# Patient Record
Sex: FEMALE | Employment: OTHER | URBAN - METROPOLITAN AREA
[De-identification: names, ages, dates, MRNs, and addresses within clinical notes are randomized per-mention and may not be internally consistent; named-entity substitution may affect disease eponyms.]

---

## 2024-05-20 ENCOUNTER — HOSPITAL ENCOUNTER (OUTPATIENT)
Dept: PHYSICAL THERAPY | Age: 84
Setting detail: RECURRING SERIES
Discharge: HOME | End: 2024-05-23
Payer: MEDICARE

## 2024-05-20 DIAGNOSIS — R29.3 ABNORMAL POSTURE: ICD-10-CM

## 2024-05-20 DIAGNOSIS — R42 DIZZINESS AND GIDDINESS: Primary | ICD-10-CM

## 2024-05-20 PROCEDURE — 97161 PT EVAL LOW COMPLEX 20 MIN: CPT

## 2024-05-20 PROCEDURE — 97530 THERAPEUTIC ACTIVITIES: CPT

## 2024-05-20 ASSESSMENT — PAIN SCALES - GENERAL: PAINLEVEL_OUTOF10: 0

## 2024-05-20 NOTE — THERAPY EVALUATION
Sherry Louise  : 1940  Primary: Medicare Part A And B (Medicare)  [facility] [place]  [place] [address]  Phone: [phone]  Fax: [phone] Plan Frequency: 2 times a week for up to 90 days  Plan of Care/Certification Expiration Date: 24        Plan of Care/Certification Expiration Date:  Plan of Care/Certification Expiration Date: 24    Frequency/Duration: Plan Frequency: 2 times a week for up to 90 days      Time In/Out:   Time In: 1015  Time Out: 1054      PT Visit Info:    Progress Note Counter: 1      Visit Count:  1                OUTPATIENT PHYSICAL THERAPY:             Initial Assessment 2024               Episode (dizziness)         Treatment Diagnosis:     Dizziness and giddiness  Abnormal posture  Medical/Referring Diagnosis:    Vertigo [R42]    Referring Physician:  Gloria Alves MD MD Orders:  PT Eval and Treat   Return MD Appt:  unknown  Date of Onset:  Onset Date: 23     Allergies:  Patient has no allergy information on record.  Restrictions/Precautions:    None      Medications Last Reviewed:  2024     SUBJECTIVE   History of Injury/Illness (Reason for Referral):  Patient reports when she lays flat, she gets dizzy.  Says it happens when she goes to the dentist office and when she got a perm at the salon. When she turns over onto her L side it will make her dizzy, she sleeps on 2 pillows at night.  Her MD said it might be her sinuses.  It started a year ago.  Last fall was about 1 year ago.   Patient Stated Goal(s):  \"get rid of the dizziness\"  Initial Pain Level:      0/10   Post Session Pain Level:     0/10  Past Medical History/Comorbidities:   Ms. Louise  has no past medical history on file.  Ms. Louise  has no past surgical history on file.  Social History/Living Environment:   Patient lives alone    Prior Level of Function/Work/Activity:   Prior Level of

## 2024-05-21 NOTE — PROGRESS NOTES
Sherry Louise  : 1940  Primary: Medicare Part A And B (Medicare)  Secondary: MICEHLLE CARPENTER [facility] [place] [address] [place] [address]  Phone: [phone]  Fax: [phone] Plan Frequency: 2 times a week for up to 90 days    Plan of Care/Certification Expiration Date: 24        Plan of Care/Certification Expiration Date:  Plan of Care/Certification Expiration Date: 24    Frequency/Duration:   Plan Frequency: 2 times a week for up to 90 days      Time In/Out:   Time In: 1015  Time Out: 1054      PT Visit Info:    Progress Note Counter: 1      Visit Count:  1    OUTPATIENT PHYSICAL THERAPY:   Treatment Note 2024       Episode  (dizziness)               Treatment Diagnosis:    Dizziness and giddiness  Abnormal posture  Medical/Referring Diagnosis:    Vertigo [R42]    Referring Physician:  Gloria Alves MD MD Orders:  PT Eval and Treat   Return MD Appt:  unknown   Date of Onset:  Onset Date: 23     Allergies:   Patient has no allergy information on record.  Restrictions/Precautions:   None      Interventions Planned (Treatment may consist of any combination of the following):     See Assessment Note    Subjective Comments:   See eval  Initial Pain Level::     0/10  Post Session Pain Level:       0/10  Medications Last Reviewed:  2024  Updated Objective Findings:  See Evaluation Note from today  Treatment   THERAPEUTIC ACTIVITY: ( 15 minutes):    Therapeutic activities per grid below to improve mobility and dynamic movement of the head and body without dizziness .  Required moderate visual, verbal, and manual cues to  decrease episodes of vertigo .  Epley maneuver to the left,  1st rep:    Position 1:  Positive symptoms, Positive nystagmus.  Held position 60 seconds. Increased latency  Position 2:  Positive symptoms, Positive nystagmus.  Held position 60 seconds. downbeating  Position 3:  Negative symptoms, Negative

## 2024-05-22 ENCOUNTER — HOSPITAL ENCOUNTER (OUTPATIENT)
Dept: PHYSICAL THERAPY | Age: 84
Setting detail: RECURRING SERIES
Discharge: HOME | End: 2024-05-25
Payer: MEDICARE

## 2024-05-22 PROCEDURE — 97530 THERAPEUTIC ACTIVITIES: CPT

## 2024-05-22 ASSESSMENT — PAIN SCALES - GENERAL: PAINLEVEL_OUTOF10: 0

## 2024-05-22 NOTE — PROGRESS NOTES
Sherry Louise  : 1940  Primary: Medicare Part A And B (Medicare)  Secondary: MICHELLE CARPENTER [facility] [place] [address] [place] [address]  Phone: [phone]  Fax: [phone] Plan Frequency: 2 times a week for up to 90 days    Plan of Care/Certification Expiration Date: 24        Plan of Care/Certification Expiration Date:  Plan of Care/Certification Expiration Date: 24    Frequency/Duration:   Plan Frequency: 2 times a week for up to 90 days      Time In/Out:   Time In: 1100  Time Out: 1130      PT Visit Info:    Progress Note Counter: 2      Visit Count:  2    OUTPATIENT PHYSICAL THERAPY:   Treatment Note 2024       Episode  (dizziness)               Treatment Diagnosis:    No data found  Medical/Referring Diagnosis:    Vertigo [R42]    Referring Physician:  Gloria Alves MD MD Orders:  PT Eval and Treat   Return MD Appt:  unknown   Date of Onset:  Onset Date: 23     Allergies:   Patient has no allergy information on record.  Restrictions/Precautions:   None      Interventions Planned (Treatment may consist of any combination of the following):     See Assessment Note    Subjective Comments:   Patient reports she got dizzy when she laid down in bed last night.  Initial Pain Level::     0/10  Post Session Pain Level:       0/10  Medications Last Reviewed:  2024  Updated Objective Findings:  See Evaluation Note from today  Treatment   THERAPEUTIC ACTIVITY: ( 25 minutes):    Therapeutic activities per grid below to improve mobility and dynamic movement of the head and body without dizziness .  Required moderate visual, verbal, and manual cues to  decrease episodes of vertigo .  Epley maneuver to the left,  1st rep:    Position 1:  Positive symptoms, Positive nystagmus.  Held position 60 seconds.   Position 2:  Positive symptoms, Positive nystagmus.  Held position 60 seconds. Downbeating, short duration, increased

## 2024-05-29 ENCOUNTER — HOSPITAL ENCOUNTER (OUTPATIENT)
Dept: PHYSICAL THERAPY | Age: 84
Setting detail: RECURRING SERIES
Discharge: HOME | End: 2024-06-01
Payer: MEDICARE

## 2024-05-29 PROCEDURE — 97530 THERAPEUTIC ACTIVITIES: CPT

## 2024-05-29 ASSESSMENT — PAIN SCALES - GENERAL: PAINLEVEL_OUTOF10: 0

## 2024-05-29 NOTE — PROGRESS NOTES
Sherry Louise  : 1940  Primary: Medicare Part A And B (Medicare)  Secondary: MICHELLE CARPENTER [facility] @ [place]  [place] [address]  Phone: [phone]  Fax: [phone] Plan Frequency: 2 times a week for up to 90 days    Plan of Care/Certification Expiration Date: 24        Plan of Care/Certification Expiration Date:  Plan of Care/Certification Expiration Date: 24    Frequency/Duration:   Plan Frequency: 2 times a week for up to 90 days      Time In/Out:   Time In: 1015  Time Out: 1042      PT Visit Info:    Progress Note Counter: 3      Visit Count:  3    OUTPATIENT PHYSICAL THERAPY:   Treatment Note 2024       Episode  (dizziness)               Treatment Diagnosis:    No data found  Medical/Referring Diagnosis:    Vertigo [R42]    Referring Physician:  Gloria Alves MD MD Orders:  PT Eval and Treat   Return MD Appt:  unknown   Date of Onset:  Onset Date: 23     Allergies:   Patient has no allergy information on record.  Restrictions/Precautions:   None      Interventions Planned (Treatment may consist of any combination of the following):     See Assessment Note    Subjective Comments:   Patient reports she didn't have any dizziness why laying back to put eye drops in yesterday.  Initial Pain Level::     0/10  Post Session Pain Level:       0/10  Medications Last Reviewed:  2024  Updated Objective Findings:  None Today  Treatment   THERAPEUTIC ACTIVITY: ( 25 minutes):    Therapeutic activities per grid below to improve mobility and dynamic movement of the head and body without dizziness .  Required moderate visual, verbal, and manual cues to  decrease episodes of vertigo .  Epley maneuver to the left,  1st rep:    Position 1:  Positive symptoms, Positive nystagmus.  Held position 60 seconds. Decrease intensitry  Position 2:  Positive symptoms, Positive nystagmus.  Held position 60 seconds. Downbeating, short

## 2024-06-04 ENCOUNTER — HOSPITAL ENCOUNTER (OUTPATIENT)
Dept: PHYSICAL THERAPY | Age: 84
Setting detail: RECURRING SERIES
Discharge: HOME | End: 2024-06-07
Payer: MEDICARE

## 2024-06-04 PROCEDURE — 97530 THERAPEUTIC ACTIVITIES: CPT

## 2024-06-04 ASSESSMENT — PAIN SCALES - GENERAL: PAINLEVEL_OUTOF10: 0

## 2024-06-04 NOTE — PROGRESS NOTES
seconds. Downbeating, short duration  Position 3:  Negative symptoms, Negative nystagmus.  Held position 90 seconds.  Position 4:  Negative symptoms, Negative nystagmus.  Held position 90 seconds.    Epley maneuver to the left,  2nd rep:    Position 1:  Positive symptoms, Positive nystagmus.  Held position 90 seconds. Decreased latency and duration  Position 2:  Positive symptoms, Positive nystagmus.  Held position 90 seconds. Very short duration, short latency  Position 3:  Negative symptoms, Negative nystagmus.  Held position 90 seconds.  Position 4:  Negative symptoms, Negative nystagmus.  Held position 90 seconds.    Epley maneuver to the left,  3rd rep:    Position 1:  Positive symptoms, Positive nystagmus.  Held position 90 seconds. Increased intensity, short duration  Position 2:  Negative symptoms, Negative nystagmus.  Held position 90 seconds.  Position 3:  Negative symptoms, Negative nystagmus.  Held position 60 seconds.  Position 4:  Negative symptoms, Negative nystagmus.  Held position 60 seconds.              Treatment/Session Summary:    Treatment Assessment: patient finally didn't have downbeating nystagmus in the second position on the thrid rep.  The nystagmus was more intense on the third rep, hopefully indicating movement.  Will recheck at next visit but appears to be progressing.     Communication/Consultation:  None today  Equipment provided today:  None  Recommendations/Intent for next treatment session: Next visit will focus on decreasing vertigo.    >Total Treatment Billable Duration:  30 minutes   Time In: 1010  Time Out: 1041    Yvette Martin PT         Charge Capture  [facility]  Appt Desk  Attendance Report     Future Appointments   Date Time Provider Department [place]   6/10/2024 11:45 AM Yvette Martin PT [facility]

## 2024-06-10 ENCOUNTER — HOSPITAL ENCOUNTER (OUTPATIENT)
Dept: PHYSICAL THERAPY | Age: 84
Setting detail: RECURRING SERIES
Discharge: HOME | End: 2024-06-13
Payer: MEDICARE

## 2024-06-10 PROCEDURE — 97530 THERAPEUTIC ACTIVITIES: CPT

## 2024-06-10 ASSESSMENT — PAIN SCALES - GENERAL: PAINLEVEL_OUTOF10: 0

## 2024-06-10 NOTE — PROGRESS NOTES
Sherry Louise  : 1940  Primary: Medicare Part A And B (Medicare)  Secondary: MICHELLE CARPENTER [facility] [place]  [address] [place] [address]  Phone: [phone]  Fax: [phone] Plan Frequency: 2 times a week for up to 90 days    Plan of Care/Certification Expiration Date: 24        Plan of Care/Certification Expiration Date:  Plan of Care/Certification Expiration Date: 24    Frequency/Duration:   Plan Frequency: 2 times a week for up to 90 days      Time In/Out:   Time In: 1145  Time Out: 1210      PT Visit Info:    Progress Note Counter: 5      Visit Count:  5    OUTPATIENT PHYSICAL THERAPY:   Treatment Note 6/10/2024       Episode  (dizziness)               Treatment Diagnosis:    No data found  Medical/Referring Diagnosis:    Vertigo [R42]    Referring Physician:  Gloria Alves MD MD Orders:  PT Eval and Treat   Return MD Appt:  unknown   Date of Onset:  Onset Date: 23     Allergies:   Patient has no allergy information on record.  Restrictions/Precautions:   None      Interventions Planned (Treatment may consist of any combination of the following):     See Assessment Note    Subjective Comments:   Patient reports when she went to put eye drops in her eyes after last visit, she got a big wave of dizziness.  It hasn't happened since that day.  Initial Pain Level::     0/10  Post Session Pain Level:       0/10  Medications Last Reviewed:  6/10/2024  Updated Objective Findings:  None Today  Treatment   THERAPEUTIC ACTIVITY: ( 25 minutes):    Therapeutic activities per grid below to improve mobility and dynamic movement of the head and body without dizziness .  Required moderate visual, verbal, and manual cues to  decrease episodes of vertigo .  Epley maneuver to the left,  1st rep:    Position 1:  Positive symptoms, Positive nystagmus.  Held position 90 seconds. Decrease intensitry, moderate duration but still long

## 2024-06-28 ENCOUNTER — HOSPITAL ENCOUNTER (OUTPATIENT)
Dept: PHYSICAL THERAPY | Age: 84
Setting detail: RECURRING SERIES
Discharge: HOME | End: 2024-07-01
Payer: MEDICARE

## 2024-06-28 PROCEDURE — 97530 THERAPEUTIC ACTIVITIES: CPT

## 2024-06-28 ASSESSMENT — PAIN SCALES - GENERAL: PAINLEVEL_OUTOF10: 0

## 2024-07-08 ENCOUNTER — HOSPITAL ENCOUNTER (OUTPATIENT)
Dept: PHYSICAL THERAPY | Age: 84
Setting detail: RECURRING SERIES
Discharge: HOME | End: 2024-07-11
Payer: MEDICARE

## 2024-07-08 PROCEDURE — 97530 THERAPEUTIC ACTIVITIES: CPT

## 2024-07-08 ASSESSMENT — PAIN SCALES - GENERAL: PAINLEVEL_OUTOF10: 0

## 2024-07-08 NOTE — PROGRESS NOTES
Sherry Louise  : 1940  Primary: Medicare Part A And B (Medicare)  Secondary: MICHELLE CARPENTER [facility] [place]  [place] [address]  Phone: [phone]  Fax: [phone] Plan Frequency: 2 times a week for up to 90 days    Plan of Care/Certification Expiration Date: 24        Plan of Care/Certification Expiration Date:  Plan of Care/Certification Expiration Date: 24    Frequency/Duration:   Plan Frequency: 2 times a week for up to 90 days      Time In/Out:   Time In: 1230  Time Out: 1255      PT Visit Info:    Progress Note Counter: 7      Visit Count:  7    OUTPATIENT PHYSICAL THERAPY:   Treatment Note 2024       Episode  (dizziness)               Treatment Diagnosis:    No data found  Medical/Referring Diagnosis:    Vertigo [R42]    Referring Physician:  Gloria Alves MD MD Orders:  PT Eval and Treat   Return MD Appt:  unknown   Date of Onset:  Onset Date: 23     Allergies:   Patient has no allergy information on record.  Restrictions/Precautions:   None      Interventions Planned (Treatment may consist of any combination of the following):     See Assessment Note    Subjective Comments:   Patient reports mild dizziness when she is on her left side.  More unsteadiness than anything these days.  Thinks it is because she didn't get enough water and she has sinus pressure.  Initial Pain Level::     0/10  Post Session Pain Level:       0/10  Medications Last Reviewed:  2024  Updated Objective Findings:  None Today  Treatment   THERAPEUTIC ACTIVITY: ( 25 minutes):    Therapeutic activities per grid below to improve mobility and dynamic movement of the head and body without dizziness .  Required moderate visual, verbal, and manual cues to  decrease episodes of vertigo .  Epley maneuver to the left,  1st rep:    Position 1:  Positive symptoms, Positive nystagmus.  Held position 90 seconds. Moderate intensitry, moderate